# Patient Record
Sex: MALE | Race: OTHER | HISPANIC OR LATINO
[De-identification: names, ages, dates, MRNs, and addresses within clinical notes are randomized per-mention and may not be internally consistent; named-entity substitution may affect disease eponyms.]

---

## 2022-12-09 ENCOUNTER — APPOINTMENT (OUTPATIENT)
Dept: SURGERY | Facility: CLINIC | Age: 57
End: 2022-12-09

## 2022-12-09 VITALS
OXYGEN SATURATION: 99 % | TEMPERATURE: 97.2 F | BODY MASS INDEX: 25.92 KG/M2 | RESPIRATION RATE: 17 BRPM | HEIGHT: 69 IN | DIASTOLIC BLOOD PRESSURE: 81 MMHG | WEIGHT: 175 LBS | HEART RATE: 71 BPM | SYSTOLIC BLOOD PRESSURE: 139 MMHG

## 2022-12-09 DIAGNOSIS — Z83.3 FAMILY HISTORY OF DIABETES MELLITUS: ICD-10-CM

## 2022-12-09 PROCEDURE — 99204 OFFICE O/P NEW MOD 45 MIN: CPT

## 2022-12-09 NOTE — REVIEW OF SYSTEMS
[As Noted in HPI] : as noted in HPI [Negative] : Heme/Lymph [FreeTextEntry7] : Right groin discomfort

## 2022-12-09 NOTE — HISTORY OF PRESENT ILLNESS
[de-identified] : Jamison Sánchez is a 57 year old male who presents for initial surgical consultation for possble Right inguinal hernia. Had u/s but not in chart. Pt. to fax report and bring disc to this appt. \par \par Today pt reports dicomfort. Daily BMs, formed, denies any bleeding or swelling. Denies nausea and vomiting. Denies fever and chills. Good appetite. Not taking any anticoagulants. Does see a bulge on right side for about 2 months, does have pain when sitting or standing (relief when laying down), is able to push hernia back inside. Was hospitalized in [place] for the hernia from Dec 1st-Dec 2nd 2022.  Hx of ventral hernia repair.  [de-identified] : 57-year-old male with symptomatic right inguinal hernia reducible with pressure is here for evaluation for repair he has had this for a number of months.  Was recently admitted to [facility] the hernia was reduced and originally was going to be planned to be repaired then but he was sent home for an elective repair he is here now for evaluation

## 2022-12-09 NOTE — PHYSICAL EXAM
[Normal Breath Sounds] : Normal breath sounds [Normal Heart Sounds] : normal heart sounds [No Rash or Lesion] : No rash or lesion [Alert] : alert [Oriented to Person] : oriented to person [Oriented to Place] : oriented to place [Oriented to Time] : oriented to time [Calm] : calm [No HSM] : no hepatosplenomegaly [Tender] : was nontender [de-identified] : WNL [de-identified] : WNL [de-identified] : ASCENCIONL [de-identified] : Soft nontender nondistended reducible right inguinal hernia no left inguinal hernia present [de-identified] : Cord and testicles within normal limits [de-identified] : WNL ROM [de-identified] : WNL

## 2022-12-09 NOTE — ASSESSMENT
[FreeTextEntry1] : 57-year-old male with symptomatic right inguinal hernia reducible manually.  He would like this repaired we have discussed at length the risk benefits and expectations of the procedure including bleeding infection recurrence as well as options he would like to proceed with an open procedure with mesh all of his questions were answered

## 2022-12-13 ENCOUNTER — OUTPATIENT (OUTPATIENT)
Dept: OUTPATIENT SERVICES | Facility: HOSPITAL | Age: 57
LOS: 1 days | End: 2022-12-13
Payer: MEDICAID

## 2022-12-13 VITALS
HEART RATE: 73 BPM | DIASTOLIC BLOOD PRESSURE: 90 MMHG | HEIGHT: 69 IN | OXYGEN SATURATION: 100 % | RESPIRATION RATE: 18 BRPM | SYSTOLIC BLOOD PRESSURE: 145 MMHG | TEMPERATURE: 98 F | WEIGHT: 169.98 LBS

## 2022-12-13 DIAGNOSIS — Z98.890 OTHER SPECIFIED POSTPROCEDURAL STATES: Chronic | ICD-10-CM

## 2022-12-13 DIAGNOSIS — Z11.52 ENCOUNTER FOR SCREENING FOR COVID-19: ICD-10-CM

## 2022-12-13 DIAGNOSIS — K40.90 UNILATERAL INGUINAL HERNIA, WITHOUT OBSTRUCTION OR GANGRENE, NOT SPECIFIED AS RECURRENT: ICD-10-CM

## 2022-12-13 DIAGNOSIS — Z01.818 ENCOUNTER FOR OTHER PREPROCEDURAL EXAMINATION: ICD-10-CM

## 2022-12-13 LAB — SARS-COV-2 RNA SPEC QL NAA+PROBE: SIGNIFICANT CHANGE UP

## 2022-12-13 PROCEDURE — U0003: CPT

## 2022-12-13 PROCEDURE — C9803: CPT

## 2022-12-13 PROCEDURE — U0005: CPT

## 2022-12-13 PROCEDURE — G0463: CPT

## 2022-12-13 RX ORDER — LIDOCAINE HCL 20 MG/ML
0.2 VIAL (ML) INJECTION ONCE
Refills: 0 | Status: DISCONTINUED | OUTPATIENT
Start: 2022-12-15 | End: 2022-12-29

## 2022-12-13 RX ORDER — SODIUM CHLORIDE 9 MG/ML
1000 INJECTION, SOLUTION INTRAVENOUS
Refills: 0 | Status: DISCONTINUED | OUTPATIENT
Start: 2022-12-15 | End: 2022-12-29

## 2022-12-13 RX ORDER — SODIUM CHLORIDE 9 MG/ML
3 INJECTION INTRAMUSCULAR; INTRAVENOUS; SUBCUTANEOUS EVERY 8 HOURS
Refills: 0 | Status: DISCONTINUED | OUTPATIENT
Start: 2022-12-15 | End: 2022-12-29

## 2022-12-13 NOTE — H&P PST ADULT - HISTORY OF PRESENT ILLNESS
Covid19 in 2020 - cough/fever x 2 weeks.   Patient denies more recent Covid19 infection/exposure,recent travel,fevers,chills,cough,SOB,loss of sense of smell/taste.   Covid19 PCR performed at [facility]. 58 yo male presents to [facility] [place] prior to scheduled Open Right Inguinal Hernia Repair on 12/15/22 with Dr. Wilmer Prado. Pmhx includes ventral hernia repair ("many years ago"). Reports noticing a bulge in right groin approximately 2 months ago. Reports hernia is painful but reducible. Denies chronic cough, constipation, difficulty urinating. Remains active; Dasi score > 8. Recently evaluated by Dr. Prado and above surgery was recommended.    Covid19 in 2020 - cough/fever x 2 weeks.   Patient denies more recent Covid19 infection/exposure,,fevers,chills,cough,SOB,loss of sense of smell/taste. Recently traveled back to [place] from  2 days ago.  Covid19 PCR performed at [facility] today.    Bloodwork performed at PCP office today.

## 2022-12-13 NOTE — H&P PST ADULT - PROBLEM SELECTOR PLAN 1
Open right inguinal hernia w/ mesh on 12/15/22 with Dr. Wilmer Prado.  Pre-op instructions given. Questions answered.  Covid19 PCR performed at [facility] today.  Bloodwork performed at PCP office.

## 2022-12-14 ENCOUNTER — TRANSCRIPTION ENCOUNTER (OUTPATIENT)
Age: 57
End: 2022-12-14

## 2022-12-15 ENCOUNTER — OUTPATIENT (OUTPATIENT)
Dept: OUTPATIENT SERVICES | Facility: HOSPITAL | Age: 57
LOS: 1 days | End: 2022-12-15
Payer: MEDICAID

## 2022-12-15 ENCOUNTER — TRANSCRIPTION ENCOUNTER (OUTPATIENT)
Age: 57
End: 2022-12-15

## 2022-12-15 ENCOUNTER — APPOINTMENT (OUTPATIENT)
Dept: SURGERY | Facility: HOSPITAL | Age: 57
End: 2022-12-15

## 2022-12-15 ENCOUNTER — RESULT REVIEW (OUTPATIENT)
Age: 57
End: 2022-12-15

## 2022-12-15 VITALS
OXYGEN SATURATION: 100 % | HEART RATE: 85 BPM | DIASTOLIC BLOOD PRESSURE: 85 MMHG | TEMPERATURE: 98 F | RESPIRATION RATE: 16 BRPM | SYSTOLIC BLOOD PRESSURE: 159 MMHG

## 2022-12-15 VITALS
RESPIRATION RATE: 18 BRPM | SYSTOLIC BLOOD PRESSURE: 153 MMHG | WEIGHT: 169.98 LBS | OXYGEN SATURATION: 98 % | TEMPERATURE: 97 F | HEIGHT: 69 IN | DIASTOLIC BLOOD PRESSURE: 81 MMHG | HEART RATE: 72 BPM

## 2022-12-15 DIAGNOSIS — K40.90 UNILATERAL INGUINAL HERNIA, WITHOUT OBSTRUCTION OR GANGRENE, NOT SPECIFIED AS RECURRENT: ICD-10-CM

## 2022-12-15 DIAGNOSIS — Z98.890 OTHER SPECIFIED POSTPROCEDURAL STATES: Chronic | ICD-10-CM

## 2022-12-15 PROCEDURE — 88304 TISSUE EXAM BY PATHOLOGIST: CPT | Mod: 26

## 2022-12-15 PROCEDURE — 49505 PRP I/HERN INIT REDUC >5 YR: CPT | Mod: RT

## 2022-12-15 PROCEDURE — C1889: CPT

## 2022-12-15 PROCEDURE — 88304 TISSUE EXAM BY PATHOLOGIST: CPT

## 2022-12-15 PROCEDURE — C1781: CPT

## 2022-12-15 PROCEDURE — 49505 PRP I/HERN INIT REDUC >5 YR: CPT | Mod: 82,RT

## 2022-12-15 DEVICE — SURGICEL POWDER 3 GRAMS: Type: IMPLANTABLE DEVICE | Status: FUNCTIONAL

## 2022-12-15 DEVICE — MESH HERNIA PERFIX PLUG MEDIUM 1.3 X 1.55": Type: IMPLANTABLE DEVICE | Status: FUNCTIONAL

## 2022-12-15 RX ORDER — CHLORHEXIDINE GLUCONATE 213 G/1000ML
1 SOLUTION TOPICAL ONCE
Refills: 0 | Status: COMPLETED | OUTPATIENT
Start: 2022-12-15 | End: 2022-12-15

## 2022-12-15 RX ORDER — DIAZEPAM 5 MG
5 TABLET ORAL ONCE
Refills: 0 | Status: DISCONTINUED | OUTPATIENT
Start: 2022-12-15 | End: 2022-12-15

## 2022-12-15 RX ORDER — HYDROMORPHONE HYDROCHLORIDE 2 MG/ML
0.5 INJECTION INTRAMUSCULAR; INTRAVENOUS; SUBCUTANEOUS
Refills: 0 | Status: DISCONTINUED | OUTPATIENT
Start: 2022-12-15 | End: 2022-12-15

## 2022-12-15 RX ORDER — OXYCODONE HYDROCHLORIDE 5 MG/1
1 TABLET ORAL
Qty: 6 | Refills: 0
Start: 2022-12-15

## 2022-12-15 RX ORDER — CEFAZOLIN SODIUM 1 G
2000 VIAL (EA) INJECTION ONCE
Refills: 0 | Status: COMPLETED | OUTPATIENT
Start: 2022-12-15 | End: 2022-12-15

## 2022-12-15 RX ORDER — ONDANSETRON 8 MG/1
4 TABLET, FILM COATED ORAL ONCE
Refills: 0 | Status: DISCONTINUED | OUTPATIENT
Start: 2022-12-15 | End: 2022-12-29

## 2022-12-15 RX ADMIN — CHLORHEXIDINE GLUCONATE 1 APPLICATION(S): 213 SOLUTION TOPICAL at 06:50

## 2022-12-15 RX ADMIN — SODIUM CHLORIDE 100 MILLILITER(S): 9 INJECTION, SOLUTION INTRAVENOUS at 06:49

## 2022-12-15 RX ADMIN — HYDROMORPHONE HYDROCHLORIDE 0.5 MILLIGRAM(S): 2 INJECTION INTRAMUSCULAR; INTRAVENOUS; SUBCUTANEOUS at 10:42

## 2022-12-15 RX ADMIN — Medication 5 MILLIGRAM(S): at 10:53

## 2022-12-15 NOTE — BRIEF OPERATIVE NOTE - NSICDXBRIEFPROCEDURE_GEN_ALL_CORE_FT
PROCEDURES:  Open repair of inguinal hernia using mesh in adult 15-Dec-2022 10:35:12  Juan Luis Collier

## 2022-12-15 NOTE — ASU DISCHARGE PLAN (ADULT/PEDIATRIC) - CARE PROVIDER_API CALL
Wilmer Prado)  Surgery  [address], [address]  [place] [address]  Phone: [phone]  Fax: [phone]  Follow Up Time: 2 weeks

## 2022-12-15 NOTE — ASU DISCHARGE PLAN (ADULT/PEDIATRIC) - NS MD DC FALL RISK RISK
For information on Fall & Injury Prevention, visit: [email or web address] OR  [email or web address] OR  https://www.cdc.gov/steadi/patient.html

## 2022-12-15 NOTE — ASU DISCHARGE PLAN (ADULT/PEDIATRIC) - NURSING INSTRUCTIONS
OK to take Tylenol/Acetaminophen at _3:15PM_____ for pain and every 6 hours after as needed. OK to take Motrin/Ibuprofen at _4PM____ for pain and every 6 hours after as needed. Take pain medicines alternate.

## 2022-12-21 PROBLEM — Z87.19 PERSONAL HISTORY OF OTHER DISEASES OF THE DIGESTIVE SYSTEM: Chronic | Status: ACTIVE | Noted: 2022-12-13

## 2022-12-22 LAB — SURGICAL PATHOLOGY STUDY: SIGNIFICANT CHANGE UP

## 2023-01-06 ENCOUNTER — APPOINTMENT (OUTPATIENT)
Dept: SURGERY | Facility: CLINIC | Age: 58
End: 2023-01-06
Payer: MEDICAID

## 2023-01-06 VITALS
OXYGEN SATURATION: 99 % | SYSTOLIC BLOOD PRESSURE: 158 MMHG | DIASTOLIC BLOOD PRESSURE: 78 MMHG | HEART RATE: 62 BPM | TEMPERATURE: 97.3 F | RESPIRATION RATE: 17 BRPM

## 2023-01-06 DIAGNOSIS — K40.90 UNILATERAL INGUINAL HERNIA, W/OUT OBSTRUCTION OR GANGRENE, NOT SPECIFIED AS RECURRENT: ICD-10-CM

## 2023-01-06 PROCEDURE — 99024 POSTOP FOLLOW-UP VISIT: CPT

## 2023-01-06 NOTE — HISTORY OF PRESENT ILLNESS
[de-identified] : Mr. Swift is a 57 year old male who presents for first post op s/o open right inguinal  hernia repair on 12-15-22. \par \par Today pt reports no pain. Denies drainage, bleeding, swelling, and redness of surgical incision. Denies nausea and vomiting. Denies fever and chills. Daily normal BM, formed. Good appetite. Not taking anticoagulants. No dysuria.

## 2023-01-06 NOTE — ASSESSMENT
[FreeTextEntry1] : 57-year-old male with right inguinal hernia, s/p open inguinal hernia repair 12/15/2022, doing well

## 2023-01-06 NOTE — HISTORY OF PRESENT ILLNESS
[de-identified] : Mr. Swift is a 57 year old male who presents for first post op s/o open right inguinal  hernia repair on 12-15-22. \par \par Today pt reports no pain. Denies drainage, bleeding, swelling, and redness of surgical incision. Denies nausea and vomiting. Denies fever and chills. Daily normal BM, formed. Good appetite. Not taking anticoagulants. No dysuria.

## 2023-01-06 NOTE — PHYSICAL EXAM
[de-identified] : NAD [de-identified] : BRENDAN RIVERA, CARLOSM [de-identified] : Soft, supple [de-identified] : b/l breath sounds [de-identified] : +s1/s2 [de-identified] : Deferred [de-identified] : Soft, ND, NT [de-identified] : right groin incision healing well, no erythema or drainage. No hernias appreciated. [de-identified] : Deferred [de-identified] : Raj, UMSAN [de-identified] : No rashes or lesions [de-identified] : CN II-XII grossly intact [de-identified] : Normal affect

## 2023-01-06 NOTE — PLAN
[FreeTextEntry1] : [] Reg diet as tolerated\par [] PO tylenol as needed\par [] OK for light physical activity and exercise; no heavy lifting or sit-ups for another 4 weeks\par [] May follow up as needed

## 2023-01-06 NOTE — PHYSICAL EXAM
[de-identified] : NAD [de-identified] : BRENDAN RIVERA, CARLOSM [de-identified] : Soft, supple [de-identified] : b/l breath sounds [de-identified] : +s1/s2 [de-identified] : Deferred [de-identified] : Soft, ND, NT [de-identified] : right groin incision healing well, no erythema or drainage. No hernias appreciated. [de-identified] : Deferred [de-identified] : Raj, USMAN [de-identified] : No rashes or lesions [de-identified] : CN II-XII grossly intact [de-identified] : Normal affect

## 2024-09-26 NOTE — ASU PATIENT PROFILE, ADULT - NSALCOHOLAMT_GEN_A_CORE_SD
Detail Level: Detailed Size Of Lesion: 1 mm Size Of Lesion: 2 mm Size Of Lesion: 6 mm Size Of Lesion In Cm (Optional): 0 Size Of Lesion: 2mm 1-2 drinks

## (undated) DEVICE — SUT POLYSORB 2-0 18" TIES UNDYED

## (undated) DEVICE — DRSG STERISTRIPS 0.5 X 4"

## (undated) DEVICE — SUT POLYSORB 3-0 30" V-20 UNDYED

## (undated) DEVICE — DRSG TEGADERM + PAD 3.5X4"

## (undated) DEVICE — POSITIONER PATIENT SAFETY STRAP 3X60"

## (undated) DEVICE — DRAIN PENROSE 0.5X12" SILICONE

## (undated) DEVICE — SUT POLYSORB 2-0 30" V-20 UNDYED

## (undated) DEVICE — DRAPE TOWEL BLUE 17" X 24"

## (undated) DEVICE — SOL IRR POUR NS 0.9% 500ML

## (undated) DEVICE — GLV 8 PROTEXIS (WHITE)

## (undated) DEVICE — VENODYNE/SCD SLEEVE CALF MEDIUM

## (undated) DEVICE — PACK ADULT HERNIA

## (undated) DEVICE — LIGASURE SMALL JAW

## (undated) DEVICE — SPECIMEN CONTAINER 100ML

## (undated) DEVICE — SUT SURGIPRO 0 30" GS-22

## (undated) DEVICE — DRSG MASTISOL

## (undated) DEVICE — SUT POLYSORB 3-0 18" TIES UNDYED

## (undated) DEVICE — DRAPE INSTRUMENT POUCH 6.75" X 11"

## (undated) DEVICE — MEDICATION LABELS W MARKER

## (undated) DEVICE — SUT BIOSYN 3-0 18" P-12

## (undated) DEVICE — PREP CHLORAPREP HI-LITE ORANGE 26ML

## (undated) DEVICE — WARMING BLANKET UPPER ADULT

## (undated) DEVICE — SOL IRR POUR H2O 250ML